# Patient Record
Sex: FEMALE | Race: WHITE | URBAN - NONMETROPOLITAN AREA
[De-identification: names, ages, dates, MRNs, and addresses within clinical notes are randomized per-mention and may not be internally consistent; named-entity substitution may affect disease eponyms.]

---

## 2021-07-29 NOTE — IMPRESSION/PLAN
Impression: Other chronic allergic conjunctivitis: H10.45. Plan: Patient educated that symptoms are caused by ocular allergies and that treatment will help alleviate symptoms but that it will not prevent allergies. Patient educated that allergy testing with an allergy specialist may be necessary to identify allergens affecting patient and treat condition. Prescribed Prednisolone acetate 1% QID OU 1 wk, TID 1 wk, BID 1 wk, QD 1 wk then D/C. May use Opcon A OTC for ocular itch.

## 2021-07-29 NOTE — IMPRESSION/PLAN
Impression: Primary iridocyclitis, right eye: H20.011. Plan: Discussed symptoms in detail with patient. Patient educated.  Prescribed Prednisolone acetate 1% QID OU 1 wk, TID 1 wk, BID 1 wk, QD 1 wk then D/C

## 2021-08-26 NOTE — IMPRESSION/PLAN
Impression: Other chronic allergic conjunctivitis: H10.45. Plan: Patient educated that symptoms are caused by ocular allergies and that treatment will help alleviate symptoms but that it will not prevent allergies. Patient educated that allergy testing with an allergy specialist may be necessary to identify allergens affecting patient and treat condition. Prescribed Azelastine BID / Olopatadine QD OU for maintenance. May use Opcon A OTC for ocular itch.

## 2022-12-06 ENCOUNTER — OFFICE VISIT (OUTPATIENT)
Dept: URBAN - NONMETROPOLITAN AREA CLINIC 8 | Facility: CLINIC | Age: 32
End: 2022-12-06
Payer: COMMERCIAL

## 2022-12-06 DIAGNOSIS — H10.45 OTHER CHRONIC ALLERGIC CONJUNCTIVITIS: ICD-10-CM

## 2022-12-06 DIAGNOSIS — H04.123 DRY EYE SYNDROME OF BILATERAL LACRIMAL GLANDS: Primary | ICD-10-CM

## 2022-12-06 PROCEDURE — 92014 COMPRE OPH EXAM EST PT 1/>: CPT | Performed by: OPTOMETRIST

## 2022-12-06 RX ORDER — AZELASTINE HYDROCHLORIDE 0.5 MG/ML
0.05 % SOLUTION/ DROPS OPHTHALMIC
Qty: 5 | Refills: 11 | Status: ACTIVE
Start: 2022-12-06

## 2022-12-06 ASSESSMENT — INTRAOCULAR PRESSURE
OS: 15
OD: 18

## 2022-12-06 NOTE — IMPRESSION/PLAN
Impression: Other chronic allergic conjunctivitis: H10.45. Plan: Continue Azelastine BID OU. eRx sent today.